# Patient Record
Sex: MALE | Race: WHITE | NOT HISPANIC OR LATINO | URBAN - METROPOLITAN AREA
[De-identification: names, ages, dates, MRNs, and addresses within clinical notes are randomized per-mention and may not be internally consistent; named-entity substitution may affect disease eponyms.]

---

## 2022-11-29 ENCOUNTER — WEB ENCOUNTER (OUTPATIENT)
Dept: URBAN - METROPOLITAN AREA CLINIC 63 | Facility: CLINIC | Age: 67
End: 2022-11-29

## 2022-11-29 ENCOUNTER — OFFICE VISIT (OUTPATIENT)
Dept: URBAN - METROPOLITAN AREA CLINIC 63 | Facility: CLINIC | Age: 67
End: 2022-11-29
Payer: MEDICARE

## 2022-11-29 VITALS
DIASTOLIC BLOOD PRESSURE: 80 MMHG | TEMPERATURE: 98.4 F | OXYGEN SATURATION: 98 % | BODY MASS INDEX: 32.93 KG/M2 | SYSTOLIC BLOOD PRESSURE: 144 MMHG | HEIGHT: 74 IN | WEIGHT: 256.6 LBS | HEART RATE: 68 BPM

## 2022-11-29 DIAGNOSIS — K51.80 OTHER ULCERATIVE COLITIS WITHOUT COMPLICATION: ICD-10-CM

## 2022-11-29 DIAGNOSIS — K22.70 BARRETT'S ESOPHAGUS WITHOUT DYSPLASIA: ICD-10-CM

## 2022-11-29 PROCEDURE — 99204 OFFICE O/P NEW MOD 45 MIN: CPT | Performed by: NURSE PRACTITIONER

## 2022-11-29 RX ORDER — AMLODIPINE BESYLATE 2.5 MG/1
TABLET ORAL
Qty: 180 TABLET | Status: ACTIVE | COMMUNITY

## 2022-11-29 RX ORDER — ATORVASTATIN CALCIUM 20 MG/1
TABLET, FILM COATED ORAL
Qty: 90 TABLET | Status: ACTIVE | COMMUNITY

## 2022-11-29 RX ORDER — GABAPENTIN 300 MG/1
CAPSULE ORAL
Qty: 540 CAPSULE | Status: ACTIVE | COMMUNITY

## 2022-11-29 RX ORDER — PANTOPRAZOLE SODIUM 40 MG/1
TABLET, DELAYED RELEASE ORAL
Qty: 90 TABLET | Status: ACTIVE | COMMUNITY

## 2022-11-29 RX ORDER — CARBAMAZEPINE 200 MG/1
TABLET ORAL
Qty: 90 TABLET | Status: ACTIVE | COMMUNITY

## 2022-11-29 NOTE — HPI-PREVIOUS IMAGING
Abdominal aorta screening/19 May 2022.  No abdominal aortic aneurysm was found.  No abdominal aortic plaque was visualized.  There is not an aortic dissection.

## 2022-11-29 NOTE — HPI-TODAY'S VISIT:
Thank you very much for kindly referring Lázaro Farley, a very pleasant 67-year-old male, to our service to establish care on a history of Hughes's esophagus and ulcerative colitis.  Past medical history significant for CAD, HTN, HLD, PVD, vitamin D deficiency, BPH, Hughes's esophagus, ulcerative colitis (2010), colon polyps and diverticulosis.  Past surgical history is anal fissure repair x2, piolinidal cystectomy and vasectomy.  His UC is currently maintained on mesalamine, 1.2 g, 3 tablets once daily and Hughes's is maintained on pantoprazole, 40 mg, once daily for chronic acid suppression.  He reports his last complete colonoscopy was 2-3 years.  Lázaro presents today without gastrointestinal complaint and states his symptoms are well controlled with his current medication regimen of mesalamine, 1.2 g 3 tablets once daily and pantoprazole, 40 mg, once daily.  He denies pyrosis, dysphagia, dyspepsia, abdominal pain, change in bowel habits, rectal bleeding, melena or unintentional weight loss.  Currently on mesalamine, 1.2 g, 3 once daily, 1-2 BM/day pantoprazole, 40 mg, once daily, as per [place] gastroenterologist

## 2022-11-29 NOTE — HPI-PREVIOUS LABS
Lab work dated 19 May 2022 demonstrates the following abnormalities: Neutrophils 43.6%, lymphocytes 41.4%, anion gap 3, vitamin D27.9.  All remaining lab values of CBC, CMP, hemoglobin A1c, B12, folate, TSH, PSA, HCV antibody and lipid panel are within normal limits.

## 2023-01-03 ENCOUNTER — OFFICE VISIT (OUTPATIENT)
Dept: URBAN - METROPOLITAN AREA CLINIC 63 | Facility: CLINIC | Age: 68
End: 2023-01-03
Payer: MEDICARE

## 2023-01-03 ENCOUNTER — DASHBOARD ENCOUNTERS (OUTPATIENT)
Age: 68
End: 2023-01-03

## 2023-01-03 VITALS
SYSTOLIC BLOOD PRESSURE: 130 MMHG | WEIGHT: 245 LBS | BODY MASS INDEX: 31.44 KG/M2 | TEMPERATURE: 98.6 F | HEART RATE: 68 BPM | OXYGEN SATURATION: 99 % | HEIGHT: 74 IN | DIASTOLIC BLOOD PRESSURE: 68 MMHG

## 2023-01-03 DIAGNOSIS — K51.80 OTHER ULCERATIVE COLITIS WITHOUT COMPLICATION: ICD-10-CM

## 2023-01-03 DIAGNOSIS — K22.70 BARRETT'S ESOPHAGUS WITHOUT DYSPLASIA: ICD-10-CM

## 2023-01-03 PROBLEM — 64766004: Status: ACTIVE | Noted: 2022-11-29

## 2023-01-03 PROBLEM — 302914006: Status: ACTIVE | Noted: 2022-11-29

## 2023-01-03 PROCEDURE — 99213 OFFICE O/P EST LOW 20 MIN: CPT | Performed by: NURSE PRACTITIONER

## 2023-01-03 RX ORDER — ATORVASTATIN CALCIUM 20 MG/1
TABLET, FILM COATED ORAL
Qty: 90 TABLET | Status: ACTIVE | COMMUNITY

## 2023-01-03 RX ORDER — AMLODIPINE BESYLATE 5 MG/1
1 TABLET TABLET ORAL
Status: ACTIVE | COMMUNITY

## 2023-01-03 RX ORDER — MESALAMINE 1.2 G/1
4 ONCE A DAY TABLET, DELAYED RELEASE ORAL
Qty: 360 TABLET | Refills: 3
Start: 2023-01-03

## 2023-01-03 RX ORDER — GABAPENTIN 300 MG/1
CAPSULE ORAL
Qty: 540 CAPSULE | Status: ACTIVE | COMMUNITY

## 2023-01-03 RX ORDER — PANTOPRAZOLE SODIUM 40 MG/1
TABLET, DELAYED RELEASE ORAL
Qty: 90 TABLET | Status: ACTIVE | COMMUNITY

## 2023-01-03 RX ORDER — CARBAMAZEPINE 200 MG/1
TABLET ORAL
Qty: 90 TABLET | Status: ACTIVE | COMMUNITY

## 2023-01-03 NOTE — HPI-TODAY'S VISIT:
Lázaro Farley is a very pleasant 67-year-old male seen in follow-up of Hughes's esophagus and distal ulcerative colitis.  He presents today without gastrointestinal complaint and relates he was able to reduce his use of mesalamine to 2 capsules once daily with good control of symptoms.  Additionally, he uses pantoprazole, 40 mg once daily and relates good efficacy.  After receipt of previous medical records, his last complete EGD was 29 October 2021 and was significant for microscopic evidence of Hughes's esophagus.  He was given a 3-year recall.  Interestingly, his last colonoscopy was in 2017 and was significant for microscopic evidence of distal ulcerative colitis up to and including the rectosigmoid colon, pathology not available for review.  Currently on mesalamine, 1.2 g, 3 once daily, 1-2 BM/day pantoprazole, 40 mg, once daily, as per [place] gastroenterologist.

## 2023-06-05 ENCOUNTER — TELEPHONE ENCOUNTER (OUTPATIENT)
Dept: URBAN - METROPOLITAN AREA CLINIC 60 | Facility: CLINIC | Age: 68
End: 2023-06-05

## 2023-06-05 RX ORDER — MESALAMINE 1.2 G/1
4 ONCE A DAY TABLET, DELAYED RELEASE ORAL
Qty: 360 TABLET | Refills: 3
Start: 2023-01-03

## 2024-05-30 ENCOUNTER — OFFICE VISIT (OUTPATIENT)
Dept: URBAN - METROPOLITAN AREA CLINIC 9 | Facility: CLINIC | Age: 69
End: 2024-05-30

## 2024-06-06 ENCOUNTER — TELEPHONE ENCOUNTER (OUTPATIENT)
Dept: URBAN - METROPOLITAN AREA CLINIC 63 | Facility: CLINIC | Age: 69
End: 2024-06-06

## 2024-06-06 ENCOUNTER — OFFICE VISIT (OUTPATIENT)
Dept: URBAN - METROPOLITAN AREA CLINIC 63 | Facility: CLINIC | Age: 69
End: 2024-06-06
Payer: MEDICARE

## 2024-06-06 ENCOUNTER — LAB OUTSIDE AN ENCOUNTER (OUTPATIENT)
Dept: URBAN - METROPOLITAN AREA CLINIC 63 | Facility: CLINIC | Age: 69
End: 2024-06-06

## 2024-06-06 VITALS
DIASTOLIC BLOOD PRESSURE: 70 MMHG | BODY MASS INDEX: 31.06 KG/M2 | TEMPERATURE: 98.5 F | OXYGEN SATURATION: 91 % | HEIGHT: 74 IN | SYSTOLIC BLOOD PRESSURE: 130 MMHG | RESPIRATION RATE: 20 BRPM | WEIGHT: 242 LBS | HEART RATE: 90 BPM

## 2024-06-06 DIAGNOSIS — K51.80 OTHER ULCERATIVE COLITIS WITHOUT COMPLICATION: ICD-10-CM

## 2024-06-06 DIAGNOSIS — K22.70 BARRETT'S ESOPHAGUS WITHOUT DYSPLASIA: ICD-10-CM

## 2024-06-06 PROCEDURE — 99214 OFFICE O/P EST MOD 30 MIN: CPT | Performed by: PHYSICIAN ASSISTANT

## 2024-06-06 RX ORDER — AMLODIPINE BESYLATE 5 MG/1
1 TABLET TABLET ORAL
Status: ACTIVE | COMMUNITY

## 2024-06-06 RX ORDER — PANTOPRAZOLE SODIUM 40 MG/1
TABLET, DELAYED RELEASE ORAL
Qty: 90 TABLET | Status: ACTIVE | COMMUNITY

## 2024-06-06 RX ORDER — ATORVASTATIN CALCIUM 20 MG/1
TABLET, FILM COATED ORAL
Qty: 90 TABLET | Status: ACTIVE | COMMUNITY

## 2024-06-06 RX ORDER — PANTOPRAZOLE SODIUM 40 MG/1
1 TABLET TABLET, DELAYED RELEASE ORAL ONCE A DAY
Qty: 90 TABLET | Refills: 3

## 2024-06-06 RX ORDER — MESALAMINE 1.2 G/1
4 ONCE A DAY TABLET, DELAYED RELEASE ORAL
Qty: 360 TABLET | Refills: 3

## 2024-06-06 RX ORDER — GABAPENTIN 300 MG/1
CAPSULE ORAL
Qty: 540 CAPSULE | Status: ACTIVE | COMMUNITY

## 2024-06-06 RX ORDER — MESALAMINE 1.2 G/1
4 ONCE A DAY TABLET, DELAYED RELEASE ORAL
Qty: 360 TABLET | Refills: 3 | Status: ACTIVE | COMMUNITY
Start: 2023-01-03

## 2024-06-06 NOTE — HPI-TODAY'S VISIT:
77-year-old male with CAD, hypertension, hyperlipidemia, PVD, BPH, Hughes's esophagus, ulcerative colitis presents to schedule surveillance EGD and colonoscopy. He was last seen in the office in 2023.  He utilizes mesalamine 4.8 g g once a day and has no abdominal pain, diarrhea, rectal bleeding. For Hughes's esophagus he takes pantoprazole 40 mg once a day. He presents to the office today doing well.  He has no GI complaints.  He has no pyrosis, dysphagia, odynophagia, nausea, vomiting, abdominal pain, constipation, diarrhea, rectal bleeding. Labs were done May 13, 2024.  His CBC was normal with hemoglobin 15.2 and platelet count 201.  CMP was unremarkable with normal liver profile.  EGD 10/29/2021 in [place] for surveillance of Hughes's esophagus:Hughes's esophagus.  C1 M3.  Distal esophageal biopsy demonstrated intestinal metaplasia.  Negative for dysplasia or malignancy.  Small hiatal hernia.  Benign gastric polyps were found in the stomach.  The duodenum appeared normal. Colonoscopy 12/6/2017:Burnout colitis involving the rectum.  No evidence of polyps.  Colonic diverticulosis.  Left colon biopsy showed focal mild lamina propria fibrosis.  Negative for active colitis, granulomas, dysplasia or malignancy.

## 2024-06-10 ENCOUNTER — P2P PATIENT RECORD (OUTPATIENT)
Age: 69
End: 2024-06-10

## 2024-07-09 ENCOUNTER — OFFICE VISIT (OUTPATIENT)
Dept: URBAN - METROPOLITAN AREA CLINIC 63 | Facility: CLINIC | Age: 69
End: 2024-07-09

## 2024-09-26 ENCOUNTER — OFFICE VISIT (OUTPATIENT)
Dept: URBAN - METROPOLITAN AREA SURGERY CENTER 4 | Facility: SURGERY CENTER | Age: 69
End: 2024-09-26

## 2024-10-10 ENCOUNTER — OFFICE VISIT (OUTPATIENT)
Dept: URBAN - METROPOLITAN AREA CLINIC 63 | Facility: CLINIC | Age: 69
End: 2024-10-10

## 2024-10-16 ENCOUNTER — P2P PATIENT RECORD (OUTPATIENT)
Age: 69
End: 2024-10-16

## 2024-10-16 ENCOUNTER — TELEPHONE ENCOUNTER (OUTPATIENT)
Dept: URBAN - METROPOLITAN AREA CLINIC 63 | Facility: CLINIC | Age: 69
End: 2024-10-16

## 2024-10-29 ENCOUNTER — LAB OUTSIDE AN ENCOUNTER (OUTPATIENT)
Dept: URBAN - METROPOLITAN AREA CLINIC 63 | Facility: CLINIC | Age: 69
End: 2024-10-29

## 2024-12-18 ENCOUNTER — OFFICE VISIT (OUTPATIENT)
Dept: URBAN - METROPOLITAN AREA SURGERY CENTER 4 | Facility: SURGERY CENTER | Age: 69
End: 2024-12-18

## 2025-02-19 ENCOUNTER — P2P PATIENT RECORD (OUTPATIENT)
Age: 70
End: 2025-02-19

## 2025-05-19 ENCOUNTER — ERX REFILL RESPONSE (OUTPATIENT)
Dept: URBAN - METROPOLITAN AREA CLINIC 63 | Facility: CLINIC | Age: 70
End: 2025-05-19

## 2025-05-19 RX ORDER — PANTOPRAZOLE SODIUM 40 MG/1
TAKE 1 TABLET BY MOUTH DAILY TABLET, DELAYED RELEASE ORAL
Qty: 90 TABLET | Refills: 0 | OUTPATIENT

## 2025-05-19 RX ORDER — PANTOPRAZOLE SODIUM 40 MG/1
1 TABLET TABLET, DELAYED RELEASE ORAL ONCE A DAY
Qty: 90 TABLET | Refills: 3 | OUTPATIENT

## 2025-07-09 ENCOUNTER — P2P PATIENT RECORD (OUTPATIENT)
Age: 70
End: 2025-07-09